# Patient Record
Sex: FEMALE | Race: WHITE
[De-identification: names, ages, dates, MRNs, and addresses within clinical notes are randomized per-mention and may not be internally consistent; named-entity substitution may affect disease eponyms.]

---

## 2019-01-15 ENCOUNTER — HOSPITAL ENCOUNTER (OUTPATIENT)
Dept: HOSPITAL 47 - RADCTMAIN | Age: 79
Discharge: HOME | End: 2019-01-15
Payer: MEDICARE

## 2019-01-15 DIAGNOSIS — Z87.891: ICD-10-CM

## 2019-01-15 DIAGNOSIS — Z12.2: Primary | ICD-10-CM

## 2019-01-15 DIAGNOSIS — R91.8: ICD-10-CM

## 2019-01-15 NOTE — CTL
EXAMINATION TYPE:  CT Low Dose Lung

 

DATE OF EXAM ORDERED: 1/15/2019

 

HISTORY: Personal history of tobacco abuse. Lung cancer screening

 

CT DLP: 60.6 mGycm

CT CTDI: 1.80 mGy

Automated exposure control for dose reduction was used.

 

SCREENING VISIT: Initial

 

COMPARISON: None

 

TECHNIQUE: Low dose computed tomography scan was performed through the chest at 1 mm thick sections a
nd reconstructed images in the coronal plane at 1 mm thick sections.

 

CT DIAGNOSTIC QUALITY: Satisfactory

 

FINDINGS: Some motion artifact of the upper lungs slightly limits evaluation.

 

LUNG NODULES: Present, detailed below:

 

There is a 3 mm pulmonary nodule that is solid in nature on series 4 image 71 in the left upper lobe.


 

There is a calcified benign granuloma on series 4 image 113 in the left upper lobe laterally.

 

There is a 3 mm subsolid pulmonary nodule in the right upper lobe on series 4 image 75 with groundgla
ss surrounding opacity such as on image 76.

 

There is a calcified benign right upper lobe granuloma on series 4 image 105 anteriorly.

 

There is a groundglass 3 mm pulmonary nodule within the right upper lobe near the interlobar fissure 
on series 4 image 118 and a subpleural location.

 

LUNGS:

COPD: Severity: Mild centrilobular

Fibrosis: Severity: None

Lymph nodes: Nonenlarged

 

RIGHT PLEURAL SPACE:

Effusion: None

Calcification: None

Thickening: None

Pneumothorax: None

 

LEFT PLEURAL SPACE:

Effusion: None

Calcification: None

Thickening: None

Pneumothorax: None

 

HEART:  

Heart Size: Mildly enlarged

Coronary calcification: Moderate

Pericardial effusion: None

 

OTHER FINDINGS:

Upper abdomen: Fluid attenuated probable 8 mm hepatic cyst in series 3 image 270 within the inferior 
right hepatic lobe. 

Bony thorax: Moderate multilevel degenerative changes of the spine are noted.

Supraclavicular region: Thyroid gland is slightly heterogenous without discrete thyroid nodule.

 

IMPRESSION: Lung RADS 2-bilateral pulmonary nodules that are subcentimeter relating to nodules with a
 very low likelihood of becoming a clinically active cancer due to lack of size. However continued an
nual screening with low dose CT in 12 months is recommended to ensure stability.

 

FOLLOW UP CT CHEST RECOMMENDATION: Continued annual screening with low dose CT in 12 months

CT LUNG RAD: Lung-Rad 2 Benign Appearance or Behavior

## 2022-06-28 ENCOUNTER — HOSPITAL ENCOUNTER (OUTPATIENT)
Dept: HOSPITAL 47 - RADMAMWWP | Age: 82
Discharge: HOME | End: 2022-06-28
Attending: FAMILY MEDICINE
Payer: MEDICARE

## 2022-06-28 DIAGNOSIS — N64.52: ICD-10-CM

## 2022-06-28 DIAGNOSIS — R92.1: Primary | ICD-10-CM

## 2022-06-28 PROCEDURE — 77062 BREAST TOMOSYNTHESIS BI: CPT

## 2022-06-28 PROCEDURE — 76642 ULTRASOUND BREAST LIMITED: CPT

## 2022-06-28 PROCEDURE — 77066 DX MAMMO INCL CAD BI: CPT

## 2022-06-28 NOTE — MM
Reason for Exam: Clinical finding. Additional evaluation requested from abnormal screening. 

Last mammogram was performed 17 year(s) and 10 month(s) ago. 





Patient History: 

Menarche at age 13. Hysterectomy at age 31. Postmenopausal. Excisional Biopsy on the Left side. 





Risk Values: 

Hallie 5 year model risk: 1.4%.

NCI Lifetime model risk: 2.2%.





Prior Study Comparison: 

4/28/2003 Bilateral Special View Mammogram, [facility]. 1/22/2004 Bilateral Special View Mammogram, [facility].

8/10/2004 Bilateral Screening Mammogram, [facility]. 





Tissue Density: 

There are scattered fibroglandular densities.





Findings: 

Analyzed By CAD. 

Mammogram

Bilateral areas of asymmetric density do not clearly show any persisting abnormality on 3-D images.

Benign vascular calcifications posterior superior right breast on magnification views. A 9 mm round

nodule at the right axilla likely represents a lymph node but can be further assessed with

ultrasound. An additional benign punctate regional calcifications upper outer quadrant right breast

on magnification views are benign.



On the left, benign vascular and oil cyst calcifications are present as well as a few scattered

benign punctate calcifications. 





Findings: 

The whole breast of the left breast, the upper outer quadrant of the right breast, the axilla of

both breasts and the retroareolar of both breasts were scanned.

Targeted right breast ultrasound upper outer quadrant and axilla demonstrates a prominent

benign-appearing 2.6 x 0.8 cm lymph node in the axilla. Possible mammographic correlate. No other

solid or cystic lesion is seen. Six-month follow-up right breast mammogram can reassess as a

precautionary measure.



Whole left breast ultrasound including scanning of the subareolar region and axilla is performed.

There is mild duct ectasia but no solid or cystic lesion. No axillary lymphadenopathy. 6 month

follow-up mammogram can be performed given the areas of asymmetric density in the lack of prior

comparison studies. 





Overall Assessment: Probably benign, BI-RAD 3





Assessment: MG 3D diag mammo w/cad [facility] - Bilateral: Incomplete: need additional imaging evaluation,

BI-RAD 0. 





Management: 

Diagnostic Mammogram of both breasts in 6 months.

1. Bilateral diagnostic mammograms in 6 months as there are no priors available for comparison. The

follow-up can reassess the areas of asymmetric density on both sides as well as the 9 mm nodule in

the right axilla, likely lymph node.

2. Consider surgical consultation for the patient's left-sided nipple discharge. Suspicious

discharge that would warrant further evaluation includes clear or bloody spontaneous discharge

localized to a single pore on the nipple.

3. Patient should continue monthly self breast exams. This exam should not preclude additional

follow-up of suspicious palpable abnormalities.



Results were given to the patient verbally at the time of exam.



Electronically signed and approved by: Rebekah Valero M.D. Radiologist

## 2022-06-28 NOTE — USB
Reason for Exam: Clinical finding. Additional evaluation requested from abnormal screening. 

Last mammogram was performed 17 year(s) and 10 month(s) ago. 





Patient History: 

Menarche at age 13. Hysterectomy at age 31. Postmenopausal. Excisional Biopsy on the Left side. 





Risk Values: 

Hallie 5 year model risk: 1.4%.

NCI Lifetime model risk: 2.2%.





Prior Study Comparison: 

4/28/2003 Bilateral Special View Mammogram, [facility]. 1/22/2004 Bilateral Special View Mammogram, [facility].

8/10/2004 Bilateral Screening Mammogram, [facility]. 





Tissue Density: 

There are scattered fibroglandular densities.





Findings: 

Analyzed By CAD. 

Mammogram

Bilateral areas of asymmetric density do not clearly show any persisting abnormality on 3-D images.

Benign vascular calcifications posterior superior right breast on magnification views. A 9 mm round

nodule at the right axilla likely represents a lymph node but can be further assessed with

ultrasound. An additional benign punctate regional calcifications upper outer quadrant right breast

on magnification views are benign.



On the left, benign vascular and oil cyst calcifications are present as well as a few scattered

benign punctate calcifications. 





Findings: 

The whole breast of the left breast, the upper outer quadrant of the right breast, the axilla of

both breasts and the retroareolar of both breasts were scanned.

Targeted right breast ultrasound upper outer quadrant and axilla demonstrates a prominent

benign-appearing 2.6 x 0.8 cm lymph node in the axilla. Possible mammographic correlate. No other

solid or cystic lesion is seen. Six-month follow-up right breast mammogram can reassess as a

precautionary measure.



Whole left breast ultrasound including scanning of the subareolar region and axilla is performed.

There is mild duct ectasia but no solid or cystic lesion. No axillary lymphadenopathy. 6 month

follow-up mammogram can be performed given the areas of asymmetric density in the lack of prior

comparison studies. 





Overall Assessment: Probably benign, BI-RAD 3





Assessment: MG 3D diag mammo w/cad [facility] - Bilateral: Incomplete: need additional imaging evaluation,

BI-RAD 0. 





Management: 

Diagnostic Mammogram of both breasts in 6 months.

1. Bilateral diagnostic mammograms in 6 months as there are no priors available for comparison. The

follow-up can reassess the areas of asymmetric density on both sides as well as the 9 mm nodule in

the right axilla, likely lymph node.

2. Consider surgical consultation for the patient's left-sided nipple discharge. Suspicious

discharge that would warrant further evaluation includes clear or bloody spontaneous discharge

localized to a single pore on the nipple.

3. Patient should continue monthly self breast exams. This exam should not preclude additional

follow-up of suspicious palpable abnormalities.



Results were given to the patient verbally at the time of exam.



Electronically signed and approved by: Rebekah Valero M.D. Radiologist

## 2023-01-05 ENCOUNTER — HOSPITAL ENCOUNTER (OUTPATIENT)
Dept: HOSPITAL 47 - RADMAMWWP | Age: 83
Discharge: HOME | End: 2023-01-05
Attending: FAMILY MEDICINE
Payer: MEDICARE

## 2023-01-05 DIAGNOSIS — Z78.0: ICD-10-CM

## 2023-01-05 DIAGNOSIS — R92.8: Primary | ICD-10-CM

## 2023-01-05 PROCEDURE — 77062 BREAST TOMOSYNTHESIS BI: CPT

## 2023-01-05 PROCEDURE — 77066 DX MAMMO INCL CAD BI: CPT

## 2023-01-05 NOTE — MM
Reason for Exam: Follow-up at short interval from prior study. 

Last screening mammogram was performed 7 month(s) ago.





Patient History: 

Menarche at age 13. Patient has no children. Hysterectomy at age 31. Postmenopausal. Excisional

Biopsy on the Left side. 





Risk Values: 

Hallie 5 year model risk: 2.1%.

NCI Lifetime model risk: 3.1%.





Tissue Density: 

There are scattered fibroglandular densities.





Findings: 

Analyzed By CAD. 

Multiple asymmetric densities are redemonstrated bilaterally. Subareolar left MLO view becomes less

defined on spot 3-D, similar to 6 months prior. Regional punctate calcifications lateral right

breast remain unchanged for 6 months. A few scattered benign oil cyst calcifications are

redemonstrated. No significant change from prior baseline exam. An additional short interval

follow-up to reassess the asymmetric densities. 





Overall Assessment: Probably benign, BI-RAD 3





Management: 

Diagnostic Mammogram of both breasts in 6 months.

1. Total one-year follow-up from the patient's new baseline exam.

2. Patient should continue monthly self breast exams.

3. This exam should not preclude additional follow-up of suspicious palpable abnormalities.



Results were given to the patient verbally at the time of exam.



Electronically signed and approved by: Rebekah Valero M.D. Radiologist